# Patient Record
Sex: FEMALE | Race: WHITE | NOT HISPANIC OR LATINO | Employment: UNEMPLOYED | URBAN - METROPOLITAN AREA
[De-identification: names, ages, dates, MRNs, and addresses within clinical notes are randomized per-mention and may not be internally consistent; named-entity substitution may affect disease eponyms.]

---

## 2023-10-27 ENCOUNTER — ANESTHESIA (OUTPATIENT)
Dept: OBSTETRICS AND GYNECOLOGY | Facility: HOSPITAL | Age: 28
End: 2023-10-27
Payer: MEDICAID

## 2023-10-27 ENCOUNTER — ANESTHESIA EVENT (OUTPATIENT)
Dept: OBSTETRICS AND GYNECOLOGY | Facility: HOSPITAL | Age: 28
End: 2023-10-27
Payer: MEDICAID

## 2023-10-27 ENCOUNTER — HOSPITAL ENCOUNTER (INPATIENT)
Facility: HOSPITAL | Age: 28
LOS: 2 days | Discharge: HOME | End: 2023-10-29
Attending: OBSTETRICS & GYNECOLOGY | Admitting: OBSTETRICS & GYNECOLOGY
Payer: MEDICAID

## 2023-10-27 DIAGNOSIS — O46.93: Primary | ICD-10-CM

## 2023-10-27 LAB
ABO GROUP (TYPE) IN BLOOD: NORMAL
ANTIBODY SCREEN: NORMAL
ERYTHROCYTE [DISTWIDTH] IN BLOOD BY AUTOMATED COUNT: 12.9 % (ref 11.5–14.5)
HBV SURFACE AG SERPL QL IA: NONREACTIVE
HCT VFR BLD AUTO: 35.5 % (ref 36–46)
HCV AB SER QL: NONREACTIVE
HGB BLD-MCNC: 11.5 G/DL (ref 12–16)
HIV 1+2 AB+HIV1 P24 AG SERPL QL IA: NONREACTIVE
MCH RBC QN AUTO: 31.4 PG (ref 26–34)
MCHC RBC AUTO-ENTMCNC: 32.4 G/DL (ref 32–36)
MCV RBC AUTO: 97 FL (ref 80–100)
NRBC BLD-RTO: 0 /100 WBCS (ref 0–0)
PLATELET # BLD AUTO: 365 X10*3/UL (ref 150–450)
PMV BLD AUTO: 10.9 FL (ref 7.5–11.5)
RBC # BLD AUTO: 3.66 X10*6/UL (ref 4–5.2)
RH FACTOR (ANTIGEN D): NORMAL
RUBV IGG SERPL IA-ACNC: 1 IA
RUBV IGG SERPL QL IA: POSITIVE
T PALLIDUM AB SER QL: NONREACTIVE
WBC # BLD AUTO: 26.8 X10*3/UL (ref 4.4–11.3)

## 2023-10-27 PROCEDURE — 86780 TREPONEMA PALLIDUM: CPT | Mod: GEALAB | Performed by: OBSTETRICS & GYNECOLOGY

## 2023-10-27 PROCEDURE — 2500000005 HC RX 250 GENERAL PHARMACY W/O HCPCS: Performed by: NURSE ANESTHETIST, CERTIFIED REGISTERED

## 2023-10-27 PROCEDURE — 87081 CULTURE SCREEN ONLY: CPT | Mod: GEALAB | Performed by: OBSTETRICS & GYNECOLOGY

## 2023-10-27 PROCEDURE — 3700000001 HC GENERAL ANESTHESIA TIME - INITIAL BASE CHARGE: Performed by: NURSE ANESTHETIST, CERTIFIED REGISTERED

## 2023-10-27 PROCEDURE — 59409 OBSTETRICAL CARE: CPT | Performed by: OBSTETRICS & GYNECOLOGY

## 2023-10-27 PROCEDURE — 88307 TISSUE EXAM BY PATHOLOGIST: CPT | Mod: TC,GEALAB | Performed by: OBSTETRICS & GYNECOLOGY

## 2023-10-27 PROCEDURE — 2500000001 HC RX 250 WO HCPCS SELF ADMINISTERED DRUGS (ALT 637 FOR MEDICARE OP): Performed by: OBSTETRICS & GYNECOLOGY

## 2023-10-27 PROCEDURE — 2500000004 HC RX 250 GENERAL PHARMACY W/ HCPCS (ALT 636 FOR OP/ED): Performed by: NURSE ANESTHETIST, CERTIFIED REGISTERED

## 2023-10-27 PROCEDURE — 87340 HEPATITIS B SURFACE AG IA: CPT | Mod: GEALAB | Performed by: OBSTETRICS & GYNECOLOGY

## 2023-10-27 PROCEDURE — 36415 COLL VENOUS BLD VENIPUNCTURE: CPT | Performed by: OBSTETRICS & GYNECOLOGY

## 2023-10-27 PROCEDURE — 3700000002 HC GENERAL ANESTHESIA TIME - EACH INCREMENTAL 1 MINUTE: Performed by: NURSE ANESTHETIST, CERTIFIED REGISTERED

## 2023-10-27 PROCEDURE — 10907ZC DRAINAGE OF AMNIOTIC FLUID, THERAPEUTIC FROM PRODUCTS OF CONCEPTION, VIA NATURAL OR ARTIFICIAL OPENING: ICD-10-PCS | Performed by: OBSTETRICS & GYNECOLOGY

## 2023-10-27 PROCEDURE — 2500000004 HC RX 250 GENERAL PHARMACY W/ HCPCS (ALT 636 FOR OP/ED): Performed by: OBSTETRICS & GYNECOLOGY

## 2023-10-27 PROCEDURE — 86803 HEPATITIS C AB TEST: CPT | Mod: GEALAB | Performed by: OBSTETRICS & GYNECOLOGY

## 2023-10-27 PROCEDURE — 1120000001 HC OB PRIVATE ROOM DAILY

## 2023-10-27 PROCEDURE — 86317 IMMUNOASSAY INFECTIOUS AGENT: CPT | Mod: GEALAB | Performed by: OBSTETRICS & GYNECOLOGY

## 2023-10-27 PROCEDURE — 86850 RBC ANTIBODY SCREEN: CPT | Performed by: OBSTETRICS & GYNECOLOGY

## 2023-10-27 PROCEDURE — 88307 TISSUE EXAM BY PATHOLOGIST: CPT | Performed by: PATHOLOGY

## 2023-10-27 PROCEDURE — 88307 TISSUE EXAM BY PATHOLOGIST: CPT | Mod: TC,SUR,GEALAB | Performed by: OBSTETRICS & GYNECOLOGY

## 2023-10-27 PROCEDURE — 87389 HIV-1 AG W/HIV-1&-2 AB AG IA: CPT | Mod: GEALAB | Performed by: OBSTETRICS & GYNECOLOGY

## 2023-10-27 PROCEDURE — 85027 COMPLETE CBC AUTOMATED: CPT | Performed by: OBSTETRICS & GYNECOLOGY

## 2023-10-27 PROCEDURE — 86901 BLOOD TYPING SEROLOGIC RH(D): CPT | Performed by: OBSTETRICS & GYNECOLOGY

## 2023-10-27 RX ORDER — METOCLOPRAMIDE HYDROCHLORIDE 5 MG/ML
10 INJECTION INTRAMUSCULAR; INTRAVENOUS EVERY 6 HOURS PRN
Status: DISCONTINUED | OUTPATIENT
Start: 2023-10-27 | End: 2023-10-27

## 2023-10-27 RX ORDER — MAGNESIUM HYDROXIDE 2400 MG/10ML
10 SUSPENSION ORAL
Status: DISCONTINUED | OUTPATIENT
Start: 2023-10-27 | End: 2023-10-29 | Stop reason: HOSPADM

## 2023-10-27 RX ORDER — OXYTOCIN 10 [USP'U]/ML
10 INJECTION, SOLUTION INTRAMUSCULAR; INTRAVENOUS ONCE AS NEEDED
Status: DISCONTINUED | OUTPATIENT
Start: 2023-10-27 | End: 2023-10-27

## 2023-10-27 RX ORDER — LIDOCAINE 560 MG/1
1 PATCH PERCUTANEOUS; TOPICAL; TRANSDERMAL
Status: DISCONTINUED | OUTPATIENT
Start: 2023-10-27 | End: 2023-10-29 | Stop reason: HOSPADM

## 2023-10-27 RX ORDER — OXYTOCIN 10 [USP'U]/ML
10 INJECTION, SOLUTION INTRAMUSCULAR; INTRAVENOUS ONCE AS NEEDED
Status: DISCONTINUED | OUTPATIENT
Start: 2023-10-27 | End: 2023-10-29 | Stop reason: HOSPADM

## 2023-10-27 RX ORDER — SODIUM CHLORIDE, SODIUM LACTATE, POTASSIUM CHLORIDE, CALCIUM CHLORIDE 600; 310; 30; 20 MG/100ML; MG/100ML; MG/100ML; MG/100ML
125 INJECTION, SOLUTION INTRAVENOUS CONTINUOUS
Status: CANCELLED | OUTPATIENT
Start: 2023-10-27

## 2023-10-27 RX ORDER — ONDANSETRON HYDROCHLORIDE 2 MG/ML
4 INJECTION, SOLUTION INTRAVENOUS EVERY 6 HOURS PRN
Status: CANCELLED | OUTPATIENT
Start: 2023-10-27

## 2023-10-27 RX ORDER — LOPERAMIDE HYDROCHLORIDE 2 MG/1
4 CAPSULE ORAL EVERY 2 HOUR PRN
Status: CANCELLED | OUTPATIENT
Start: 2023-10-27

## 2023-10-27 RX ORDER — DIPHENHYDRAMINE HCL 25 MG
25 CAPSULE ORAL EVERY 6 HOURS PRN
Status: DISCONTINUED | OUTPATIENT
Start: 2023-10-27 | End: 2023-10-29 | Stop reason: HOSPADM

## 2023-10-27 RX ORDER — LABETALOL HYDROCHLORIDE 5 MG/ML
20 INJECTION, SOLUTION INTRAVENOUS ONCE AS NEEDED
Status: DISCONTINUED | OUTPATIENT
Start: 2023-10-27 | End: 2023-10-29 | Stop reason: HOSPADM

## 2023-10-27 RX ORDER — OXYTOCIN 10 [USP'U]/ML
10 INJECTION, SOLUTION INTRAMUSCULAR; INTRAVENOUS ONCE AS NEEDED
Status: CANCELLED | OUTPATIENT
Start: 2023-10-27

## 2023-10-27 RX ORDER — LOPERAMIDE HYDROCHLORIDE 2 MG/1
4 CAPSULE ORAL EVERY 2 HOUR PRN
Status: DISCONTINUED | OUTPATIENT
Start: 2023-10-27 | End: 2023-10-29 | Stop reason: HOSPADM

## 2023-10-27 RX ORDER — ONDANSETRON 4 MG/1
4 TABLET, FILM COATED ORAL EVERY 6 HOURS PRN
Status: DISCONTINUED | OUTPATIENT
Start: 2023-10-27 | End: 2023-10-27

## 2023-10-27 RX ORDER — HYDRALAZINE HYDROCHLORIDE 20 MG/ML
5 INJECTION INTRAMUSCULAR; INTRAVENOUS ONCE AS NEEDED
Status: DISCONTINUED | OUTPATIENT
Start: 2023-10-27 | End: 2023-10-27

## 2023-10-27 RX ORDER — POLYETHYLENE GLYCOL 3350 17 G/17G
17 POWDER, FOR SOLUTION ORAL 2 TIMES DAILY PRN
Status: DISCONTINUED | OUTPATIENT
Start: 2023-10-27 | End: 2023-10-29 | Stop reason: HOSPADM

## 2023-10-27 RX ORDER — METHYLERGONOVINE MALEATE 0.2 MG/ML
0.2 INJECTION INTRAVENOUS ONCE AS NEEDED
Status: DISCONTINUED | OUTPATIENT
Start: 2023-10-27 | End: 2023-10-27

## 2023-10-27 RX ORDER — HYDRALAZINE HYDROCHLORIDE 20 MG/ML
5 INJECTION INTRAMUSCULAR; INTRAVENOUS ONCE AS NEEDED
Status: DISCONTINUED | OUTPATIENT
Start: 2023-10-27 | End: 2023-10-29 | Stop reason: HOSPADM

## 2023-10-27 RX ORDER — IBUPROFEN 600 MG/1
600 TABLET ORAL EVERY 6 HOURS
Status: DISCONTINUED | OUTPATIENT
Start: 2023-10-27 | End: 2023-10-29 | Stop reason: HOSPADM

## 2023-10-27 RX ORDER — BISACODYL 10 MG/1
10 SUPPOSITORY RECTAL DAILY PRN
Status: DISCONTINUED | OUTPATIENT
Start: 2023-10-27 | End: 2023-10-29 | Stop reason: HOSPADM

## 2023-10-27 RX ORDER — METOCLOPRAMIDE 10 MG/1
10 TABLET ORAL EVERY 6 HOURS PRN
Status: DISCONTINUED | OUTPATIENT
Start: 2023-10-27 | End: 2023-10-27

## 2023-10-27 RX ORDER — ONDANSETRON HYDROCHLORIDE 2 MG/ML
4 INJECTION, SOLUTION INTRAVENOUS EVERY 6 HOURS PRN
Status: DISCONTINUED | OUTPATIENT
Start: 2023-10-27 | End: 2023-10-27

## 2023-10-27 RX ORDER — OXYTOCIN/0.9 % SODIUM CHLORIDE 30/500 ML
60 PLASTIC BAG, INJECTION (ML) INTRAVENOUS ONCE AS NEEDED
Status: CANCELLED | OUTPATIENT
Start: 2023-10-27

## 2023-10-27 RX ORDER — TERBUTALINE SULFATE 1 MG/ML
0.25 INJECTION SUBCUTANEOUS ONCE AS NEEDED
Status: CANCELLED | OUTPATIENT
Start: 2023-10-27

## 2023-10-27 RX ORDER — LIDOCAINE HYDROCHLORIDE 10 MG/ML
30 INJECTION INFILTRATION; PERINEURAL ONCE AS NEEDED
Status: CANCELLED | OUTPATIENT
Start: 2023-10-27

## 2023-10-27 RX ORDER — SODIUM CHLORIDE, SODIUM LACTATE, POTASSIUM CHLORIDE, CALCIUM CHLORIDE 600; 310; 30; 20 MG/100ML; MG/100ML; MG/100ML; MG/100ML
125 INJECTION, SOLUTION INTRAVENOUS CONTINUOUS
Status: DISCONTINUED | OUTPATIENT
Start: 2023-10-27 | End: 2023-10-27

## 2023-10-27 RX ORDER — LABETALOL HYDROCHLORIDE 5 MG/ML
20 INJECTION, SOLUTION INTRAVENOUS ONCE AS NEEDED
Status: DISCONTINUED | OUTPATIENT
Start: 2023-10-27 | End: 2023-10-27

## 2023-10-27 RX ORDER — LABETALOL HYDROCHLORIDE 5 MG/ML
20 INJECTION, SOLUTION INTRAVENOUS ONCE AS NEEDED
Status: CANCELLED | OUTPATIENT
Start: 2023-10-27

## 2023-10-27 RX ORDER — DIPHENHYDRAMINE HYDROCHLORIDE 50 MG/ML
25 INJECTION INTRAMUSCULAR; INTRAVENOUS EVERY 6 HOURS PRN
Status: DISCONTINUED | OUTPATIENT
Start: 2023-10-27 | End: 2023-10-29 | Stop reason: HOSPADM

## 2023-10-27 RX ORDER — SIMETHICONE 80 MG
80 TABLET,CHEWABLE ORAL 4 TIMES DAILY PRN
Status: DISCONTINUED | OUTPATIENT
Start: 2023-10-27 | End: 2023-10-29 | Stop reason: HOSPADM

## 2023-10-27 RX ORDER — MISOPROSTOL 200 UG/1
800 TABLET ORAL ONCE AS NEEDED
Status: DISCONTINUED | OUTPATIENT
Start: 2023-10-27 | End: 2023-10-29 | Stop reason: HOSPADM

## 2023-10-27 RX ORDER — MISOPROSTOL 200 UG/1
800 TABLET ORAL ONCE AS NEEDED
Status: CANCELLED | OUTPATIENT
Start: 2023-10-27

## 2023-10-27 RX ORDER — METOCLOPRAMIDE HYDROCHLORIDE 5 MG/ML
10 INJECTION INTRAMUSCULAR; INTRAVENOUS EVERY 6 HOURS PRN
Status: CANCELLED | OUTPATIENT
Start: 2023-10-27

## 2023-10-27 RX ORDER — METHYLERGONOVINE MALEATE 0.2 MG/ML
0.2 INJECTION INTRAVENOUS ONCE AS NEEDED
Status: DISCONTINUED | OUTPATIENT
Start: 2023-10-27 | End: 2023-10-29 | Stop reason: HOSPADM

## 2023-10-27 RX ORDER — NIFEDIPINE 10 MG/1
10 CAPSULE ORAL ONCE AS NEEDED
Status: DISCONTINUED | OUTPATIENT
Start: 2023-10-27 | End: 2023-10-27

## 2023-10-27 RX ORDER — NIFEDIPINE 10 MG/1
10 CAPSULE ORAL ONCE AS NEEDED
Status: DISCONTINUED | OUTPATIENT
Start: 2023-10-27 | End: 2023-10-29 | Stop reason: HOSPADM

## 2023-10-27 RX ORDER — ONDANSETRON HYDROCHLORIDE 2 MG/ML
4 INJECTION, SOLUTION INTRAVENOUS EVERY 6 HOURS PRN
Status: DISCONTINUED | OUTPATIENT
Start: 2023-10-27 | End: 2023-10-29 | Stop reason: HOSPADM

## 2023-10-27 RX ORDER — TERBUTALINE SULFATE 1 MG/ML
0.25 INJECTION SUBCUTANEOUS ONCE AS NEEDED
Status: DISCONTINUED | OUTPATIENT
Start: 2023-10-27 | End: 2023-10-27

## 2023-10-27 RX ORDER — FENTANYL/ROPIVACAINE/NS/PF 2MCG/ML-.2
PLASTIC BAG, INJECTION (ML) INJECTION
Status: COMPLETED
Start: 2023-10-27 | End: 2023-10-27

## 2023-10-27 RX ORDER — METOCLOPRAMIDE 10 MG/1
10 TABLET ORAL EVERY 6 HOURS PRN
Status: CANCELLED | OUTPATIENT
Start: 2023-10-27

## 2023-10-27 RX ORDER — METHYLERGONOVINE MALEATE 0.2 MG/ML
0.2 INJECTION INTRAVENOUS ONCE AS NEEDED
Status: CANCELLED | OUTPATIENT
Start: 2023-10-27

## 2023-10-27 RX ORDER — TRANEXAMIC ACID 100 MG/ML
1000 INJECTION, SOLUTION INTRAVENOUS ONCE AS NEEDED
Status: DISCONTINUED | OUTPATIENT
Start: 2023-10-27 | End: 2023-10-29 | Stop reason: HOSPADM

## 2023-10-27 RX ORDER — OXYTOCIN/0.9 % SODIUM CHLORIDE 30/500 ML
60 PLASTIC BAG, INJECTION (ML) INTRAVENOUS ONCE AS NEEDED
Status: COMPLETED | OUTPATIENT
Start: 2023-10-27 | End: 2023-10-27

## 2023-10-27 RX ORDER — CARBOPROST TROMETHAMINE 250 UG/ML
250 INJECTION, SOLUTION INTRAMUSCULAR ONCE AS NEEDED
Status: CANCELLED | OUTPATIENT
Start: 2023-10-27

## 2023-10-27 RX ORDER — ONDANSETRON 4 MG/1
4 TABLET, FILM COATED ORAL EVERY 6 HOURS PRN
Status: DISCONTINUED | OUTPATIENT
Start: 2023-10-27 | End: 2023-10-29 | Stop reason: HOSPADM

## 2023-10-27 RX ORDER — CARBOPROST TROMETHAMINE 250 UG/ML
250 INJECTION, SOLUTION INTRAMUSCULAR ONCE AS NEEDED
Status: DISCONTINUED | OUTPATIENT
Start: 2023-10-27 | End: 2023-10-27

## 2023-10-27 RX ORDER — ONDANSETRON 4 MG/1
4 TABLET, FILM COATED ORAL EVERY 6 HOURS PRN
Status: CANCELLED | OUTPATIENT
Start: 2023-10-27

## 2023-10-27 RX ORDER — TRANEXAMIC ACID 100 MG/ML
1000 INJECTION, SOLUTION INTRAVENOUS ONCE AS NEEDED
Status: DISCONTINUED | OUTPATIENT
Start: 2023-10-27 | End: 2023-10-27

## 2023-10-27 RX ORDER — ACETAMINOPHEN 325 MG/1
975 TABLET ORAL EVERY 6 HOURS
Status: DISCONTINUED | OUTPATIENT
Start: 2023-10-27 | End: 2023-10-29 | Stop reason: HOSPADM

## 2023-10-27 RX ORDER — LIDOCAINE HCL/EPINEPHRINE/PF 2%-1:200K
VIAL (ML) INJECTION AS NEEDED
Status: DISCONTINUED | OUTPATIENT
Start: 2023-10-27 | End: 2023-10-27

## 2023-10-27 RX ORDER — OXYTOCIN/0.9 % SODIUM CHLORIDE 30/500 ML
60 PLASTIC BAG, INJECTION (ML) INTRAVENOUS ONCE AS NEEDED
Status: DISCONTINUED | OUTPATIENT
Start: 2023-10-27 | End: 2023-10-27

## 2023-10-27 RX ORDER — FENTANYL/ROPIVACAINE/NS/PF 2MCG/ML-.2
0-25 PLASTIC BAG, INJECTION (ML) INJECTION CONTINUOUS
Status: DISCONTINUED | OUTPATIENT
Start: 2023-10-27 | End: 2023-10-27

## 2023-10-27 RX ORDER — MISOPROSTOL 200 UG/1
800 TABLET ORAL ONCE AS NEEDED
Status: DISCONTINUED | OUTPATIENT
Start: 2023-10-27 | End: 2023-10-27

## 2023-10-27 RX ORDER — TRANEXAMIC ACID 100 MG/ML
1000 INJECTION, SOLUTION INTRAVENOUS ONCE AS NEEDED
Status: CANCELLED | OUTPATIENT
Start: 2023-10-27

## 2023-10-27 RX ORDER — CARBOPROST TROMETHAMINE 250 UG/ML
250 INJECTION, SOLUTION INTRAMUSCULAR ONCE AS NEEDED
Status: DISCONTINUED | OUTPATIENT
Start: 2023-10-27 | End: 2023-10-29 | Stop reason: HOSPADM

## 2023-10-27 RX ORDER — LOPERAMIDE HYDROCHLORIDE 2 MG/1
4 CAPSULE ORAL EVERY 2 HOUR PRN
Status: DISCONTINUED | OUTPATIENT
Start: 2023-10-27 | End: 2023-10-27

## 2023-10-27 RX ORDER — NIFEDIPINE 10 MG/1
10 CAPSULE ORAL ONCE AS NEEDED
Status: CANCELLED | OUTPATIENT
Start: 2023-10-27

## 2023-10-27 RX ORDER — LIDOCAINE HYDROCHLORIDE 10 MG/ML
30 INJECTION INFILTRATION; PERINEURAL ONCE AS NEEDED
Status: DISCONTINUED | OUTPATIENT
Start: 2023-10-27 | End: 2023-10-27

## 2023-10-27 RX ORDER — HYDRALAZINE HYDROCHLORIDE 20 MG/ML
5 INJECTION INTRAMUSCULAR; INTRAVENOUS ONCE AS NEEDED
Status: CANCELLED | OUTPATIENT
Start: 2023-10-27

## 2023-10-27 RX ADMIN — LIDOCAINE HYDROCHLORIDE AND EPINEPHRINE 3 ML: 20; 5 INJECTION, SOLUTION EPIDURAL; INFILTRATION; INTRACAUDAL; PERINEURAL at 08:38

## 2023-10-27 RX ADMIN — Medication 6 ML/HR: at 08:42

## 2023-10-27 RX ADMIN — SODIUM CHLORIDE, POTASSIUM CHLORIDE, SODIUM LACTATE AND CALCIUM CHLORIDE 125 ML/HR: 600; 310; 30; 20 INJECTION, SOLUTION INTRAVENOUS at 08:13

## 2023-10-27 RX ADMIN — IBUPROFEN 600 MG: 600 TABLET, FILM COATED ORAL at 13:10

## 2023-10-27 RX ADMIN — ACETAMINOPHEN 975 MG: 325 TABLET ORAL at 19:35

## 2023-10-27 RX ADMIN — ACETAMINOPHEN 975 MG: 325 TABLET ORAL at 13:10

## 2023-10-27 RX ADMIN — Medication 60 MILLI-UNITS/MIN: at 10:35

## 2023-10-27 RX ADMIN — IBUPROFEN 600 MG: 600 TABLET, FILM COATED ORAL at 19:38

## 2023-10-27 RX ADMIN — SODIUM CHLORIDE, POTASSIUM CHLORIDE, SODIUM LACTATE AND CALCIUM CHLORIDE 125 ML/HR: 600; 310; 30; 20 INJECTION, SOLUTION INTRAVENOUS at 07:30

## 2023-10-27 SDOH — HEALTH STABILITY: MENTAL HEALTH: WERE YOU ABLE TO COMPLETE ALL THE BEHAVIORAL HEALTH SCREENINGS?: NO

## 2023-10-27 SDOH — HEALTH STABILITY: MENTAL HEALTH: CURRENT SMOKER: 1

## 2023-10-27 SDOH — SOCIAL STABILITY: SOCIAL INSECURITY: ABUSE SCREEN: ADULT

## 2023-10-27 SDOH — HEALTH STABILITY: MENTAL HEALTH: NON-SPECIFIC ACTIVE SUICIDAL THOUGHTS (PAST 1 MONTH): NO

## 2023-10-27 SDOH — HEALTH STABILITY: MENTAL HEALTH: SUICIDAL BEHAVIOR (LIFETIME): NO

## 2023-10-27 SDOH — HEALTH STABILITY: MENTAL HEALTH: WISH TO BE DEAD (PAST 1 MONTH): NO

## 2023-10-27 SDOH — HEALTH STABILITY: MENTAL HEALTH

## 2023-10-27 ASSESSMENT — LIFESTYLE VARIABLES
HOW OFTEN DO YOU HAVE 6 OR MORE DRINKS ON ONE OCCASION: NEVER
HOW MANY STANDARD DRINKS CONTAINING ALCOHOL DO YOU HAVE ON A TYPICAL DAY: PATIENT DOES NOT DRINK
HOW OFTEN DO YOU HAVE A DRINK CONTAINING ALCOHOL: NEVER
SKIP TO QUESTIONS 9-10: 1
AUDIT-C TOTAL SCORE: 0
AUDIT-C TOTAL SCORE: 0

## 2023-10-27 ASSESSMENT — PAIN SCALES - GENERAL
PAINLEVEL_OUTOF10: 7
PAINLEVEL_OUTOF10: 7
PAINLEVEL_OUTOF10: 0 - NO PAIN
PAINLEVEL_OUTOF10: 10 - WORST POSSIBLE PAIN
PAINLEVEL_OUTOF10: 5 - MODERATE PAIN
PAIN_LEVEL: 2
PAINLEVEL_OUTOF10: 10 - WORST POSSIBLE PAIN

## 2023-10-27 ASSESSMENT — PAIN DESCRIPTION - DESCRIPTORS
DESCRIPTORS: CRAMPING
DESCRIPTORS: CRAMPING

## 2023-10-27 NOTE — H&P
Obstetrical Admission History and Physical     Rosario Anand is a 28 y.o. . Presents with VB and contractions.  Endorses acute onset of watery VB at 545 followed by onset of ctx.  HITESH per pt 23, uncertain how this was obtained as she reports her HITESH was changed several times.    Preg c/b  - no prenatal carte records. Pt reports PNC in [place]. PN labs sent on admission.  - Tobacco and MJ use - UDS sent on admission  - GBS unknown - collected on admit    OB hx: . No pregnancy complications including no h/o PTD, HDP, GDM. FT  x5.     Chief Complaint: Vaginal Bleeding    Assessment/Plan    - Labor  - FHT cat 1  - admit to L&D  - epidural as desired  - Tobacco and MJ use - UDS sent on admission  - GBS unknown - collected on admit    Principal Problem:    Pregnancy with third trimester bleeding      Pregnancy Problems (from 10/27/23 to present)       Problem Noted Resolved    Pregnancy with third trimester bleeding 10/27/2023 by Franky Mcdermott MD No    Priority:  Medium             Obstetrical History   OB History    Para Term  AB Living   1             SAB IAB Ectopic Multiple Live Births                  # Outcome Date GA Lbr Luciano/2nd Weight Sex Delivery Anes PTL Lv   1 Current                Past Medical History  denies    Past Surgical History   denies    Social History  Social History     Tobacco Use    Smoking status: Not on file    Smokeless tobacco: Not on file   Substance Use Topics    Alcohol use: Not on file     Substance and Sexual Activity   Drug Use Not on file       Allergies  Patient has no known allergies.     Medications  Medications Prior to Admission   Medication Sig Dispense Refill Last Dose    prenatal no115/iron/folic acid (PRENATAL 19 ORAL) Take by mouth.          Objective    Last Vitals  Temp Pulse Resp BP MAP O2 Sat   36.5 °C (97.7 °F) 77 20 120/69   99 %     Physical Examination  NAD, uncomfortable with ctx intermittently  Abd soft, gravid  : blood on  perineum and dark spot of blood on chucks and on glove after exam. ~10mL total  SVE 5/70/-1, moderate firmness   --> 135 mod derik +accels no decels  Ctx q 2 min      Lab Review  No recent labs to review in system.

## 2023-10-27 NOTE — ANESTHESIA POSTPROCEDURE EVALUATION
Patient: Rosario Anand    Procedure Summary       Date: 10/27/23 Room / Location:     Anesthesia Start: 0820 Anesthesia Stop: 1035    Procedure: Labor Analgesia Diagnosis:     Scheduled Providers:  Responsible Provider:     Anesthesia Type: epidural ASA Status: 2            Anesthesia Type: epidural        Anesthesia Post Evaluation    Patient location during evaluation: bedside  Level of consciousness: awake and alert  Pain score: 2  Pain management: satisfactory to patient  Airway patency: patent  Cardiovascular status: acceptable  Respiratory status: acceptable  Hydration status: acceptable  Comments: /76   Pulse 91   Temp 37.1 °C (98.7 °F) (Oral)   Resp 20          No notable events documented.

## 2023-10-27 NOTE — CARE PLAN
The patient's goals for the shift include Aadequate pain relief.    The clinical goals for the shift include No signs/symptoms of postpartum hemorrhage.

## 2023-10-27 NOTE — PROGRESS NOTES
Intrapartum Progress Note    Subjective   More comfortable with epidural just placed, getting some relief with ctx    Objective   NAD, discomfort with ctx though improved. Comfortable between ctx.     Last Vitals:  /69 Temp 36.5 °C (97.7 °F) Pulse 79     Tracin mod derik +accels no decels  Ctx: q 1.5-2 min ctx in her own labor    VE: 6/80/-1, soft cervix  BBOW, AROMed for clear  GBS collected    FHT cat 1  Spontaneous labor  AROMed for clear  S/p epidural placement.      Dilution (U/0.1 Cc): 1

## 2023-10-27 NOTE — L&D DELIVERY NOTE
OB Delivery Note  10/27/2023  Rosario Anand  28 y.o.   Vaginal, Spontaneous        Gestational Age: Unknown  /Para:   Quantitative Blood Loss: Admission to Discharge: 250 mL (10/27/2023  7:13 AM - 10/27/2023 11:25 AM)    Geovanna Anand [[id number]]      Labor Events    Sac identifier: Sac 1  Rupture date/time: 10/27/2023 0848  Rupture type: Artificial  Fluid color: Clear  Fluid odor: None  Labor type: Spontaneous Onset of Labor  Labor allowed to proceed with plans for an attempted vaginal birth?: Yes  Complications: Abruptio Placenta       Placenta    Placenta delivery date/time:   Placenta removal: Spontaneous  Placenta appearance: Abnormal  Placenta disposition: pathology       Cord    Delayed cord clamping?: No  Gases sent?: No  Cord comments: Attempted delayed cord clamping. Placenta delivered spontaneously with the infant.       Lacerations    Episiotomy: None  Perineal laceration: None  Other lacerations?: No  Repair suture: None       Anesthesia    Method: Epidural       Aldrich Delivery    Birth date/time: 10/27/2023 10:34:00  Delivery type: Vaginal, Spontaneous  Complications: Abruptio Placenta       Resuscitation    Method: None       Apgars    Living status: Living  Apgar Component Scores:  1 min.:  5 min.:  10 min.:  15 min.:  20 min.:    Skin color:  1  1       Heart rate:  2  2       Reflex irritability:  2  2       Muscle tone:  2  2       Respiratory effort:  2  2       Total:  9  9       Apgars assigned by: LA DIAZ       Delivery Providers    Delivering clinician: Franky Mcdermott MD   Provider Role    Gertrude Waggoner RN Delivery Nurse    La Diaz, RN Nursery Nurse     Resident               Bloody fluid with delivery. Spontaneous delivery of placenta with infant and clots noted behind placenta.   Franky Mcdermott MD

## 2023-10-27 NOTE — ANESTHESIA PROCEDURE NOTES
Epidural Block    Patient location during procedure: OB  Start time: 10/27/2023 8:25 AM  End time: 10/27/2023 8:32 AM  Reason for block: labor analgesia  Staffing  Performed: CRNA   Authorized by: ROCKY Salmon    Performed by: ROCKY Salmon    Preanesthetic Checklist  Completed: patient identified, IV checked, risks and benefits discussed, surgical consent, pre-op evaluation, timeout performed and sterile techniques followed  Block Timeout  RN/Licensed healthcare professional reads aloud to the Anesthesia provider and entire team: Patient identity, procedure with side and site, patient position, and as applicable the availability of implants/special equipment/special requirements.  Patient on coagulant treatment: no  Timeout performed at: 10/27/2023 8:22 AM  Block Placement  Patient position: sitting  Prep: ChloraPrep  Sterility prep: cap, drape, gloves, hand and mask  Sedation level: no sedation  Patient monitoring: blood pressure, continuous pulse oximetry and heart rate  Approach: midline  Local numbing: lidocaine 1% to skin and subcutaneous tissues  Vertebral space: lumbar  Lumbar location: L3-L4  Epidural  Loss of resistance technique: saline  Guidance: landmark technique        Needle  Needle type: Tuohy   Needle gauge: 17  Needle length: 9 cm  Needle insertion depth: 5 cm  Catheter type: multi-orifice  Catheter size: 19 G  Catheter at skin depth: 12 cm  Catheter securement method: clear occlusive dressing    Test dose: lidocaine 1.5% with epinephrine 1-to-200,000  Test dose given at 10/27/2023 8:38 AM  Test dose: lidocaine 1.5% with epinephrine 1-to-200,000  Test dose result: no positive test dose            Assessment  Sensory level: T10 bilateral  Block outcome: pain improved  Number of attempts: 1  Events: no positive test dose  Procedure assessment: patient tolerated procedure well with no immediate complications

## 2023-10-27 NOTE — ANESTHESIA PREPROCEDURE EVALUATION
Patient: Rosario Anand    Evaluation Method: In-person visit    Procedure Information    Date: 10/27/23  Procedure: Labor Analgesia         Relevant Problems   Anesthesia (within normal limits)      Cardiovascular (within normal limits)      Endocrine (within normal limits)      GI (within normal limits)      /Renal (within normal limits)      Neuro/Psych (within normal limits)      Pulmonary (within normal limits)      GI/Hepatic (within normal limits)      Hematology (within normal limits)      Musculoskeletal (within normal limits)      Eyes, Ears, Nose, and Throat (within normal limits)      Infectious Disease (within normal limits)       Clinical information reviewed:   Tobacco  Allergies  Meds   Med Hx  Surg Hx   Fam Hx  Soc Hx        NPO Detail:  NPO/Void Status  Date of Last Liquid: 10/27/23  Time of Last Liquid: 0630  Date of Last Solid: 10/26/23  Time of Last Solid: 2100         OB/Gyn Evaluation    Present Pregnancy    Patient is pregnant now.   Obstetric History                Physical Exam    Airway  Mallampati: II  TM distance: >3 FB     Cardiovascular   Rhythm: regular     Dental   (+) upper dentures, lower dentures     Pulmonary    Abdominal            Anesthesia Plan    ASA 2     epidural     The patient is a current smoker.  Patient was not previously instructed to abstain from smoking on day of procedure.  Patient smoked on day of procedure.    Anesthetic plan and risks discussed with patient.

## 2023-10-28 LAB
AMPHETAMINES UR QL SCN: ABNORMAL
BARBITURATES UR QL SCN: ABNORMAL
BENZODIAZ UR QL SCN: ABNORMAL
BZE UR QL SCN: ABNORMAL
CANNABINOIDS UR QL SCN: ABNORMAL
FENTANYL+NORFENTANYL UR QL SCN: ABNORMAL
OPIATES UR QL SCN: ABNORMAL
OXYCODONE+OXYMORPHONE UR QL SCN: ABNORMAL
PCP UR QL SCN: ABNORMAL

## 2023-10-28 PROCEDURE — 80307 DRUG TEST PRSMV CHEM ANLYZR: CPT | Performed by: OBSTETRICS & GYNECOLOGY

## 2023-10-28 PROCEDURE — 2500000001 HC RX 250 WO HCPCS SELF ADMINISTERED DRUGS (ALT 637 FOR MEDICARE OP): Performed by: OBSTETRICS & GYNECOLOGY

## 2023-10-28 PROCEDURE — 1120000001 HC OB PRIVATE ROOM DAILY

## 2023-10-28 PROCEDURE — 87800 DETECT AGNT MULT DNA DIREC: CPT | Mod: GEALAB | Performed by: OBSTETRICS & GYNECOLOGY

## 2023-10-28 RX ADMIN — ACETAMINOPHEN 975 MG: 325 TABLET ORAL at 19:30

## 2023-10-28 RX ADMIN — ACETAMINOPHEN 975 MG: 325 TABLET ORAL at 07:20

## 2023-10-28 RX ADMIN — IBUPROFEN 600 MG: 600 TABLET, FILM COATED ORAL at 13:45

## 2023-10-28 RX ADMIN — IBUPROFEN 600 MG: 600 TABLET, FILM COATED ORAL at 01:33

## 2023-10-28 RX ADMIN — ACETAMINOPHEN 975 MG: 325 TABLET ORAL at 01:33

## 2023-10-28 RX ADMIN — IBUPROFEN 600 MG: 600 TABLET, FILM COATED ORAL at 19:30

## 2023-10-28 RX ADMIN — IBUPROFEN 600 MG: 600 TABLET, FILM COATED ORAL at 07:20

## 2023-10-28 RX ADMIN — ACETAMINOPHEN 975 MG: 325 TABLET ORAL at 13:45

## 2023-10-28 SDOH — ECONOMIC STABILITY: GENERAL

## 2023-10-28 SDOH — HEALTH STABILITY: MENTAL HEALTH: BEHAVIOR: ORIENTED

## 2023-10-28 SDOH — HEALTH STABILITY: MENTAL HEALTH: SUBSTANCE USE: THC

## 2023-10-28 SDOH — SOCIAL STABILITY: SOCIAL INSECURITY: FEELS SAFE LIVING IN HOME: YES

## 2023-10-28 SDOH — HEALTH STABILITY: MENTAL HEALTH: FAMILY HISTORY SUBSTANCE USE: SIGNIFICANT OTHER

## 2023-10-28 SDOH — HEALTH STABILITY: MENTAL HEALTH

## 2023-10-28 SDOH — HEALTH STABILITY: MENTAL HEALTH: RECENT CHANGES IN MENTAL STATUS/COGNITIVE FUNCTIONING: NO CHANGES

## 2023-10-28 ASSESSMENT — PAIN SCALES - GENERAL
PAINLEVEL_OUTOF10: 1
PAINLEVEL_OUTOF10: 0 - NO PAIN
PAINLEVEL_OUTOF10: 2
PAINLEVEL_OUTOF10: 4

## 2023-10-28 ASSESSMENT — PAIN SCALES - PAIN ASSESSMENT IN ADVANCED DEMENTIA (PAINAD)
FACIALEXPRESSION: SMILING OR INEXPRESSIVE
BREATHING: NORMAL
CONSOLABILITY: UNABLE TO CONSOLE, DISTRACT OR REASSURE
TOTALSCORE: 2
BODYLANGUAGE: RELAXED

## 2023-10-28 ASSESSMENT — ACTIVITIES OF DAILY LIVING (ADL): BATHING: NO ASSISTANCE

## 2023-10-28 NOTE — PROGRESS NOTES
No  c/o  , min   bleeding  / pain Pos  flatus ,   heart   rrr  lungs  cta   abd   soft   n/t    extr    neg   Ppd  1    doing   well  plan   home  in   [place]   ,  no   sex    rv  this  week   for   bp   check   ,   home   bps  , had   2   mild   range   bps   but  now   they   are   normal  , no  s/s  of pec        Yuri Lee MD

## 2023-10-28 NOTE — CARE PLAN
The patient's goals for the shift include Aadequate pain relief.    The clinical goals for the shift include No signs/symptoms of postpartum hemorrhage.    Pt pain well-managed. Bonding well with infant. JACKIE and jerilyn GUERRA.

## 2023-10-28 NOTE — CARE PLAN
The patient's goals for the shift include stable BP, pain control    The clinical goals for the shift include stable recovery, pain control

## 2023-10-28 NOTE — CARE PLAN
SW consulted to meet with pt regarding concerns with THC and safety at home.  Per pt, she lives in a home with RIGOBERTO, Ever and their 4 other children (ages 6, 5, 2 & 1).  Pt experienced an abruption yesterday and was an emergency delivery; baby tested positive for amphetamines.  Pt admits to smoking cigarettes and THC but not taking amphetamines.  Pt was living in [place] with her children while Ever was in [place] working.  Pt moved here about 1 month ago and has not been able to establish pre-sam care since moving.  Pt was seeing Dr. Crawley in [place].  Pt deneis any need for baby at this time; pt is transferring WIC from [place] to [place].  Pt denies any MH history or PPD; pt is safe at home.  Pt states mom is nearby and supportive; other friends are in [place].  PARRISH advised pt that report to CPS will be made; pt stated understanding.  PARRISH made report & updated RN.

## 2023-10-28 NOTE — DISCHARGE INSTR - APPOINTMENTS
Pre-K programs:  [employer]  [address]  [place], [place]  [id number] [phone]    [facility]   [address]  [place], [place]  [id number] [phone]    [facility] [employer]  -run via [facility] [employer] [phone]    [facility]  [address]  [place], [place] [id number] [phone]

## 2023-10-28 NOTE — PROGRESS NOTES
10/28/23 1300   Referral Data   Referral Source Physician   Referral Reason Substance Abuse   County Information   County of Residence [facility]   Patient Information   Primary Caregiver Self   Provides Primary Care For Children   Support System Immediate family   Lives With significant other and minor children   SW Readmission Information    Readmission within 30 Days No   Home Safety   Feels Safe Living in Home Yes   Family Member Substance Use   Family History Substance Use Significant Other   Reported Level of Street Drug/Medication/Inhalant Use Addiction/Dependency   Substance Use THC   Activities of Daily Living   Functional Status Independent   Living Arrangement House   Ambulation Independent   Dressing Independent   Feeding Independent   Bladder Control Continent   Bowel Control Continent   Bathing/Grooming No assistance   Behavior Oriented   Communication Can write;Talks;Understands speaking;Understands English;Reads   Income Information   Employment Status for Patient   Employment Status Unemployed   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

## 2023-10-29 VITALS
HEART RATE: 72 BPM | TEMPERATURE: 97.9 F | SYSTOLIC BLOOD PRESSURE: 116 MMHG | OXYGEN SATURATION: 98 % | DIASTOLIC BLOOD PRESSURE: 54 MMHG | RESPIRATION RATE: 18 BRPM

## 2023-10-29 PROBLEM — O46.93: Status: RESOLVED | Noted: 2023-10-27 | Resolved: 2023-10-29

## 2023-10-29 LAB
C TRACH RRNA SPEC QL NAA+PROBE: NEGATIVE
N GONORRHOEA DNA SPEC QL PROBE+SIG AMP: NEGATIVE

## 2023-10-29 PROCEDURE — 2500000001 HC RX 250 WO HCPCS SELF ADMINISTERED DRUGS (ALT 637 FOR MEDICARE OP): Performed by: OBSTETRICS & GYNECOLOGY

## 2023-10-29 RX ORDER — ACETAMINOPHEN 325 MG/1
650 TABLET ORAL EVERY 6 HOURS PRN
Qty: 30 TABLET | Refills: 0 | Status: SHIPPED | OUTPATIENT
Start: 2023-10-29

## 2023-10-29 RX ADMIN — ACETAMINOPHEN 975 MG: 325 TABLET ORAL at 07:34

## 2023-10-29 RX ADMIN — IBUPROFEN 600 MG: 600 TABLET, FILM COATED ORAL at 01:42

## 2023-10-29 RX ADMIN — IBUPROFEN 600 MG: 600 TABLET, FILM COATED ORAL at 07:34

## 2023-10-29 RX ADMIN — ACETAMINOPHEN 975 MG: 325 TABLET ORAL at 01:42

## 2023-10-29 ASSESSMENT — PAIN SCALES - GENERAL
PAINLEVEL_OUTOF10: 4
PAINLEVEL_OUTOF10: 2

## 2023-10-29 NOTE — DISCHARGE SUMMARY
Discharge Diagnosis  Pregnancy with third trimester bleeding    Issues Requiring Follow-Up  Bp  check this  week  ,  ss   follow   up       Test Results Pending At Discharge  Pending Labs       Order Current Status    C. trachomatis + N. gonorrhoeae, Amplified In process    Group B Streptococcus (GBS) Prenatal Screen, Culture In process    Surgical Pathology Exam - PLACENTA In process            Hospital Course      37 weeks   with  abruption   but  delivered   vaginally ,   pos   thc ,  baby   pos  amphetamine  ,   2   mild  range   bps   ,  discharged  ppd   2   and  then   appt    this   week for  bp   check ,   bp   checks   at  home   as   well      Pertinent Physical Exam At Time of Discharge  Physical Exam    Home Medications     Medication List      ASK your doctor about these medications     PRENATAL 19 ORAL       Outpatient Follow-Up  This  week  for   bp  check       Yuri Lee MD

## 2023-10-29 NOTE — PROGRESS NOTES
No  c/o  , min   bleeding  / pain Pos  flatus ,   heart   rrr  lungs  cta   abd   soft   n/t    extr    neg   Ppd  2    doing   well  plan   home    ,  no   sex  x 6 weeks  ,  2 mild  range  bps   then   normal   no   HA   etc   , home   bp   checks   call   if  increasing   ,   follow   up   this   week  for  bp   check  , call   if   s/s   of  pec  ,  pt   given   list  of  docs   to   see for   care           Yuri Lee MD

## 2023-10-30 LAB — GP B STREP GENITAL QL CULT: NORMAL

## 2023-11-01 LAB
LABORATORY COMMENT REPORT: NORMAL
PATH REPORT.FINAL DX SPEC: NORMAL
PATH REPORT.GROSS SPEC: NORMAL
PATH REPORT.RELEVANT HX SPEC: NORMAL
PATH REPORT.TOTAL CANCER: NORMAL
RESIDENT REVIEW: NORMAL

## 2023-11-03 ENCOUNTER — TELEPHONE (OUTPATIENT)
Dept: OBSTETRICS AND GYNECOLOGY | Facility: HOSPITAL | Age: 28
End: 2023-11-03

## 2023-11-03 NOTE — PROGRESS NOTES
Follow-Up Note    Care Type: Women's Health  Phone Number Called: [phone]    Call Outcome (connected, left message, no answer, phone disconnected): left message  Patient reports feeling symptoms are (better, worse, same):     After returning home from the hospital, was it clear to you:     Which Meds were new Meds?   Which Meds to continue?   Which Meds to stop?   Who participated in medication reconciliation with the hospital staff (patient, family, other, no one):     To be answered by care coordinator or staff member doing call back:     In your professional opinion, do you think there was a medication discrepancy or potential for medication discrepancy in this situation?     Medication issues (none, confusion which medications to take, non-adherence to medication, prescription unfilled-inadequate funds, prescription unfilled-pharmacy will not fill (prescription unfilled-unable to get to pharmacy, troubled by side effects, other-see comments):     Discharge Instructions were clear:    Patient has a primary care provider:   Post-hospital follow-up occurred according to schedule:   Reason (appointment scheduled in future, appointment was never scheduled-encouraged patient to call, no appointment available within discharge plan, patient missed appointment):     Delivered baby(ies):     Chest Pain?:  SOB or difficulty breathing?:   Seizures?:    Any thoughts of hurting yourself or your baby?:  Bleeding that is soaking through one pad/hour or blood clots the size of an egg or larger?:  Incision that is not healing?    Red or swollen leg that is painful or warm to the touch?   Temperature of 100.4*F or higher?:  Headache that does not get better, even after taking medicine, or a bad headache with vision changes?:    Where or in what is your baby sleeping?  ABC's of sleep covered?     How are you feeding your baby(ies)-breast, EBM, formula, supplementation?:   Baby getting anything other than breastmilk or formula  for food?    Patient has Primary Care Provider for baby(ies)?   Baby has been seen by a health care provider since discharge?  If no, reason (appointment scheduled in the future, appointment was never scheduled, no appointment available within discharge plan, patient missed appointment):     Mom's Discharge Date: 10/29/2023  Date Baby was seen by provider:    Patient has specific name and number of who to call for concerns?:     Date/Time of Call: 11/3/2023 @ 7565  Call back done by (care coordinator, relationship based nurse, patient returned call, , lactation consultant, manager/supervisor, patient navigator, social work, other-see comments):     Comments:               Attempt Date 1: 11/3/2023  Call Attempt 1 outcome: left message    Attempt Date 2: 11/8/2023  Call Attempt 2 outcome: Received a call back stating this phone number is not the correct number for the patient.